# Patient Record
Sex: MALE | Race: WHITE
[De-identification: names, ages, dates, MRNs, and addresses within clinical notes are randomized per-mention and may not be internally consistent; named-entity substitution may affect disease eponyms.]

---

## 2018-02-12 ENCOUNTER — HOSPITAL ENCOUNTER (OUTPATIENT)
Dept: HOSPITAL 17 - CPRE | Age: 63
End: 2018-02-12
Attending: ORTHOPAEDIC SURGERY
Payer: OTHER GOVERNMENT

## 2018-02-12 DIAGNOSIS — M17.12: Primary | ICD-10-CM

## 2018-02-26 ENCOUNTER — HOSPITAL ENCOUNTER (INPATIENT)
Dept: HOSPITAL 17 - HSDI | Age: 63
LOS: 1 days | Discharge: HOME HEALTH | DRG: 470 | End: 2018-02-27
Attending: ORTHOPAEDIC SURGERY | Admitting: ORTHOPAEDIC SURGERY
Payer: OTHER GOVERNMENT

## 2018-02-26 VITALS
OXYGEN SATURATION: 97 % | TEMPERATURE: 95.9 F | RESPIRATION RATE: 17 BRPM | HEART RATE: 109 BPM | SYSTOLIC BLOOD PRESSURE: 112 MMHG | DIASTOLIC BLOOD PRESSURE: 69 MMHG

## 2018-02-26 VITALS
RESPIRATION RATE: 17 BRPM | DIASTOLIC BLOOD PRESSURE: 80 MMHG | SYSTOLIC BLOOD PRESSURE: 117 MMHG | OXYGEN SATURATION: 97 % | HEART RATE: 88 BPM | TEMPERATURE: 95.4 F

## 2018-02-26 VITALS — BODY MASS INDEX: 30.22 KG/M2 | HEIGHT: 66 IN | WEIGHT: 188.05 LBS

## 2018-02-26 VITALS
DIASTOLIC BLOOD PRESSURE: 78 MMHG | RESPIRATION RATE: 16 BRPM | SYSTOLIC BLOOD PRESSURE: 126 MMHG | TEMPERATURE: 97 F | HEART RATE: 82 BPM | OXYGEN SATURATION: 100 %

## 2018-02-26 VITALS — HEART RATE: 82 BPM

## 2018-02-26 VITALS — OXYGEN SATURATION: 97 %

## 2018-02-26 DIAGNOSIS — B19.20: ICD-10-CM

## 2018-02-26 DIAGNOSIS — I10: ICD-10-CM

## 2018-02-26 DIAGNOSIS — M17.12: Primary | ICD-10-CM

## 2018-02-26 PROCEDURE — 86900 BLOOD TYPING SEROLOGIC ABO: CPT

## 2018-02-26 PROCEDURE — 0QUC07Z SUPPLEMENT LEFT LOWER FEMUR WITH AUTOLOGOUS TISSUE SUBSTITUTE, OPEN APPROACH: ICD-10-PCS | Performed by: ORTHOPAEDIC SURGERY

## 2018-02-26 PROCEDURE — 85027 COMPLETE CBC AUTOMATED: CPT

## 2018-02-26 PROCEDURE — 0QUH07Z SUPPLEMENT LEFT TIBIA WITH AUTOLOGOUS TISSUE SUBSTITUTE, OPEN APPROACH: ICD-10-PCS | Performed by: ORTHOPAEDIC SURGERY

## 2018-02-26 PROCEDURE — 86901 BLOOD TYPING SEROLOGIC RH(D): CPT

## 2018-02-26 PROCEDURE — 86850 RBC ANTIBODY SCREEN: CPT

## 2018-02-26 PROCEDURE — 73560 X-RAY EXAM OF KNEE 1 OR 2: CPT

## 2018-02-26 PROCEDURE — L1830 KO IMMOB CANVAS LONG PRE OTS: HCPCS

## 2018-02-26 PROCEDURE — 0QBC0ZZ EXCISION OF LEFT LOWER FEMUR, OPEN APPROACH: ICD-10-PCS | Performed by: ORTHOPAEDIC SURGERY

## 2018-02-26 PROCEDURE — 0QPH04Z REMOVAL OF INTERNAL FIXATION DEVICE FROM LEFT TIBIA, OPEN APPROACH: ICD-10-PCS | Performed by: ORTHOPAEDIC SURGERY

## 2018-02-26 PROCEDURE — 94150 VITAL CAPACITY TEST: CPT

## 2018-02-26 PROCEDURE — 3E0T3BZ INTRODUCTION OF ANESTHETIC AGENT INTO PERIPHERAL NERVES AND PLEXI, PERCUTANEOUS APPROACH: ICD-10-PCS | Performed by: ANESTHESIOLOGY

## 2018-02-26 PROCEDURE — 80048 BASIC METABOLIC PNL TOTAL CA: CPT

## 2018-02-26 PROCEDURE — C1776 JOINT DEVICE (IMPLANTABLE): HCPCS

## 2018-02-26 PROCEDURE — 0SRD0J9 REPLACEMENT OF LEFT KNEE JOINT WITH SYNTHETIC SUBSTITUTE, CEMENTED, OPEN APPROACH: ICD-10-PCS | Performed by: ORTHOPAEDIC SURGERY

## 2018-02-26 PROCEDURE — 0QPC04Z REMOVAL OF INTERNAL FIXATION DEVICE FROM LEFT LOWER FEMUR, OPEN APPROACH: ICD-10-PCS | Performed by: ORTHOPAEDIC SURGERY

## 2018-02-26 RX ADMIN — VANCOMYCIN HYDROCHLORIDE SCH MLS/HR: 1 INJECTION, SOLUTION INTRAVENOUS at 08:59

## 2018-02-26 RX ADMIN — PANTOPRAZOLE SODIUM PRN MG: 20 TABLET, DELAYED RELEASE ORAL at 22:40

## 2018-02-26 RX ADMIN — PHENYTOIN SODIUM SCH MLS/HR: 50 INJECTION INTRAMUSCULAR; INTRAVENOUS at 18:07

## 2018-02-26 RX ADMIN — HYDROCODONE BITARTRATE AND ACETAMINOPHEN PRN TAB: 7.5; 325 TABLET ORAL at 21:11

## 2018-02-26 RX ADMIN — PHENYTOIN SODIUM SCH MLS/HR: 50 INJECTION INTRAMUSCULAR; INTRAVENOUS at 12:48

## 2018-02-26 RX ADMIN — VANCOMYCIN HYDROCHLORIDE SCH MLS/HR: 1 INJECTION, SOLUTION INTRAVENOUS at 10:17

## 2018-02-26 NOTE — MP
cc:

ANGELA NEGRETE M.D.

****

 

 

DATE OF SURGERY: 2/26/2018

 

PREOPERATIVE DIAGNOSIS:

Left knee severe osteoarthritis, history of prior ACL reconstruction with

retained hardware.

 

POSTOPERATIVE DIAGNOSES

Left knee severe osteoarthritis, history of prior ACL reconstruction with

retained hardware.

 

PROCEDURE

Left total knee arthroplasty with removal of deep hardware, left knee.

 

SURGEON

Dr. Angela Negrete.

 

FIRST ASSISTANT:

AUGIE Ward

 

ANESTHESIA

General.

 

ESTIMATED BLOOD LOSS:

100 cc.

 

TOURNIQUET TIME:

40 minutes at 250 mmHg.

 

COMPLICATIONS:

None.

 

IMPLANTS:

DePuy Attune size 6, posterior stabilized femoral component size 6, rotating

platform tibia baseplate size 5 mm, polyethylene  tibial insert size 38

patella.

 

JUSTIFICATION:

This patient is a gentleman who sustained previous injury to his left knee

resulting in a previous anterior cruciate ligament tear.  He underwent previous

anterior cruciate ligament reconstruction back in 1994.  He has developed

severe degenerative osteoarthritis of his knee over the subsequent years.  He

has severe disabling pain with standing, walking, ambulation with activities,

severe pain at rest.  It does interfere with activities of daily living.  The

patient has failed greater than 3 months of prior nonoperative conservative to

include medications, therapy, injections, ambulatory assistive aids, home

exercise program, activity modification.  The patient is not overweight.

 

X-rays of the left knee reveals severe end-stage osteoarthritis, bone-on-bone

joint space narrowing, subchondral sclerosis, subchondral cyst, osteophyte

formation, varus deformity with subluxation and evidence of prior retained

metal interference screw in both the femur and tibia.

 

The patient was counseled as to the risks, benefits and alternatives to total

knee arthroplasty with removal of deep hardware.  The risks were discussed

which included but not limited to bleeding, infection, damage to nerves, blood

vessels, pain, stiffness, failure of components, blood clots, pulmonary

embolism and even death.  The patient's pain is very severe and he did wish to

proceed with surgery.

 

PROCEDURE IN DETAIL:

A written consent was obtained.  The patient identified by name, taken to the

operating room table, placed in supine position.  General anesthesia was

administered, as well as 2 grams of IV Ancef, one gram of IV vancomycin.  He

did receive a preoperative adductor canal femoral nerve block by the

anesthesiologist using ultrasound guidance.  A well-padded tourniquet was

placed on the left thigh.  The left lower extremity was prepped and draped

using isopropyl alcohol, Hibiclens solution and Chloraprep solution.  After a

time-out was performed an Esmarch bandage was used to exsanguinate the left

lower extremity.  Tourniquet inflated 250 mmHg.  A longitudinal incision was

made in the anterior aspect of the left knee.  A medial parapatellar arthrotomy

was performed.  The patella was everted.  The  patellar resection guide was

used to resect 9 mm of patella.  The size 38 mm guide was placed.  The size 38

mm patella trial fit well.

 

At this point attention was turned to the medial aspect of the proximal tibia

where I was able to identify the metal tibial interference screw and using a

3.5-mm hex , I was able to remove that screw.  I then examined the

superolateral aspect of the femur and I was able to identify the metal femoral

interference screw and I used a 3.5 mm hex  to remove that screw as well.

 

 

At this point I took bone graft from the femur and I was able to fill the

tibial tunnel and femoral tunnel pathways where the prior screws were removed.

 

 

At this point a drill was used in the intramedullary canal of the femur.

Subsequently the distal femoral guide was set to remove 11 mm of distal femur,

5 degrees off the anatomic valgus axis alignment.  Oscillating saw was used for

the distal femoral cut.

 

Attention was turned to the tibia and extramedullary tibial guide was set to

remove 5 mm off the lowest portion of the medial tibial plateau.  The tibial

guide set in place and tibia cut was performed.  A 5-mm spacer block showed

full extension.  Attention turned back to the femur where AP sizing block

measured to size 6.  Anterior reference 30 degree external rotation guide was

used to pin a size 6 block in place.  The anterior, posterior and chamfer cuts

were performed. A size 6 PCL box was pinned in place and PCL box with an

oscillating saw.  The medial lateral meniscus remnants were removed as well as

bone and soft tissue debris from the posterior portion of the knee.  A size 6

tibia base plate was pinned in place and tibia was drilled and punched.  Trial

components were evaluated and cemented in place.

 

With the current component, the left leg achieved full extension to 0 degrees

of flexion 140, no evidence of tibial lift-off, varus-valgus balance appeared

appropriate and symmetric and the patella was noted to track centrally.  With

the tourniquet deflated Bovie cautery was used for hemostasis.  The knee was

thoroughly irrigated with sterile saline pulse lavage antibiotic impregnated

solution.  The arthrotomy incision was closed with 1-0 Vicryl suture.  The

subcutaneous tissue with 2-0 Vicryl suture and skin incision closed with

Dermabond.  Sterile dressing applied.  No intraoperative complications noted.

 

Primo Hernández, physician assistant certified, was present during the entire

procedure to include patient positioning and the procedure itself.  The medical

necessity of the physician assistant is indicated in this case due to the

complexity of the procedure itself.  He assisted with appropriate manipulation

of the leg, also retraction of muscle, tendon, bone and neurovascular

structures.  He assisted with implantation of the prosthetic replacement as

well.

 

                              _________________________________

                              MD MISSY Steve/ALDEN

D:  2/26/2018/11:27 AM

T:  2/26/2018/8:52 PM

Visit #:  [id number]

Job #:  [id number]

## 2018-02-26 NOTE — RADRPT
EXAM DATE/TIME:  02/26/2018 12:25 

 

[facility] COMPARISON:     

No previous studies available for comparison.

 

                     

INDICATIONS :     

Post-op left knee.

                     

 

MEDICAL HISTORY :     

None.          

 

SURGICAL HISTORY :     

Total knee replacement, left.   

 

ENCOUNTER:     

Initial                                        

 

ACUITY:     

1 day      

 

PAIN SCORE:     

0/10

 

LOCATION:     

Left  Knee

 

FINDINGS:     

Post surgical changes are noted following total knee replacement. Prosthetic components are well-seat
ed and satisfactorily aligned. There is no acute bony abnormality.

 

CONCLUSION:     

Satisfactory postoperative appearance of the left knee status post knee replacement.

 

 

 

 Jr Garcia MD on February 26, 2018 at 12:52           

Board Certified Radiologist.

 This report was verified electronically.

## 2018-02-26 NOTE — PD.CONS
HPI


Service


[facility]


Consult Requested By


 








Dr. Eagle.


Reason for Consult





 


Medical management


Primary Care Physician


[facility]


Diagnoses:  


History of Present Illness


This is a 62-year-old male with history severe left osteoarthritis of the knee 

presented with elective surgery with left knee total arthroplasty done today by 

Dr. Eagle.  Patient seen the PACU.  Patient has no complaints.  I reviewed his 

past medical history which includes hypertension and hepatitis C.  He stated 

his hepatitis C is not active.  Patient stated that he will have a couple 

drinks a day but does not need to drink every day.  He denies having any 

symptoms of alcohol withdrawals or feeling shaky if he does not drink.





Otherwise all other review of system reviewed and negative.





Past Family Social History


Allergies:  


Coded Allergies:  


     lisinopril (Verified  Allergy, Severe, Swelling, 2/26/18)


 swelling with high does only


Past Medical History


Hypertension


Hepatitis C treated


Past Surgical History


Previous knee surgery


Shoulder surgery


Wrist and hand surgery


Reported Medications








Aspirin Low Dose (Aspirin) 81 Mg Chew 81 Mg CHEW BID 30 Days


Norco (Hydrocodone-Acetaminophen) 7.5-325 mg Tab 1-2 Tab PO Q6H PRN


Amlodipine (Amlodipine Besylate) 10 Mg Tab 10 Mg PO DAILY


Active Ordered Medications





Current Medications


Amlodipine Besylate (Norvasc) 10 mg DAILY PO ;  Start 2/26/18 at 09:00


Sodium Chloride 1,000 ml @  100 mls/hr Q10H IV  Last administered on 2/26/18at 

12:48;  Start 2/26/18 at 08:45


Cefazolin Sodium 1000 mg/Sodium Chloride 100 ml @  200 mls/hr Q6H IV ;  Start 2/ 26/18 at 15:00;  Stop 2/27/18 at 03:29


Miscellaneous Information (Post-op Orders (for Pharmacy))  STAT  ONCE XX ;  

Start 2/26/18 at 09:00;  Stop 2/26/18 at 09:02;  Status DC


Enoxaparin Sodium (Lovenox Inj) 40 mg Q24H SQ ;  Start 2/27/18 at 11:00;  Stop 3

/8/18 at 11:01


Morphine Sulfate (Morphine Inj) 3 mg Q3H  PRN IV PUSH BREAKTHROUGH PAIN;  Start 

2/26/18 at 12:00


Acetaminophen/ Hydrocodone Bitart (Norco  7.5-325 Mg) 1 tab Q4H  PRN PO PAIN 

LESS THAN 5 ON SCALE;  Start 2/26/18 at 08:45


Acetaminophen/ Hydrocodone Bitart (Norco  7.5-325 Mg) 2 tab Q4H  PRN PO PAIN 

SCALE 5 TO 10;  Start 2/26/18 at 08:45


Multivitamins/ Minerals Therapeutic (Theragran M Tab) 1 tab BID PO ;  Start 2/27 /18 at 21:00;  Stop 4/28/18 at 20:59


Ondansetron HCl (Zofran Inj) 4 mg Q6H  PRN IVP NAUSEA OR VOMITING;  Start 2/26/ 18 at 08:45


Docusate Sodium (Colace) 100 mg BID PO ;  Start 2/27/18 at 21:00


Zolpidem Tartrate (Ambien) 5 mg HS  PRN PO SLEEP;  Start 2/26/18 at 21:00


Bisacodyl (Dulcolax Supp) 10 mg DAILY  PRN RECTAL CONSTIPATION;  Start 2/26/18 

at 09:15


Diphenhydramine HCl (Benadryl Inj) 25 mg Q6H  PRN IV PUSH ITCHING;  Start 2/26/ 18 at 08:45


Sodium Chloride 250 ml @  As Directed STK-MED ONCE .ROUTE ;  Start 2/26/18 at 07

:09;  Stop 2/26/18 at 07:10;  Status DC


Vancomycin HCl (Vancomycin Inj) 1,000 mg STK-MED ONCE .ROUTE ;  Start 2/26/18 

at 07:09;  Stop 2/26/18 at 07:10;  Status DC


Cefazolin Sodium/ Dextrose 50 ml @ As Directed STK-MED ONCE .ROUTE ;  Start 2/26 /18 at 07:09;  Stop 2/26/18 at 07:10;  Status DC


Dexamethasone Sodium Phosphate (Decadron Pf Inj) 10 mg STK-MED ONCE .ROUTE  

Last administered on 2/26/18at 07:26;  Start 2/26/18 at 07:10;  Stop 2/26/18 at 

07:11;  Status DC


Lactated Ringer's 1,000 ml @  30 mls/hr Q24H PRN IV SEE LABEL COMMENTS Last 

administered on 2/26/18at 08:00;  Start 2/26/18 at 08:15;  Stop 3/1/18 at 08:14


Sodium Chloride 500 ml @  30 mls/hr [id number] PRN IV SEE LABEL COMMENTS;  Start 2/ 26/18 at 08:15;  Stop 3/1/18 at 08:14


Metoprolol Tartrate (Lopressor) 25 mg ON CALL  PRN PO SEE LABEL COMMENTS;  

Start 2/26/18 at 08:15;  Stop 3/1/18 at 08:14


Povidone Iodine (Betadine 5% Antisepsis Kit) 1 applic ON CALL  PRN EACH NARE 

SEE LABEL COMMENTS Last administered on 2/26/18at 08:00;  Start 2/26/18 at 08:15

;  Stop 3/1/18 at 08:14


Chlorhexidine Gluconate (Chlorhexidine 2% Cloth) 3 pack ON CALL  PRN TOPICAL 

SEE LABEL COMMENTS Last administered on 2/26/18at 07:30;  Start 2/26/18 at 08:15

;  Stop 3/1/18 at 08:14


Povidone Iodine (Betadine 7.5% Scrub) 1 applic ONCE TOPICAL  Last administered 

on 2/26/18at 08:00;  Start 2/26/18 at 08:15;  Stop 3/1/18 at 08:14


Chlorhexidine Gluconate (Hibiclens 4% Top Soln) 1 applic ONCE TOPICAL ;  Start 2 /26/18 at 08:15;  Stop 3/1/18 at 08:14


Cefazolin Sodium/ Dextrose 50 ml @  100 mls/hr ON  CALL IV  Last administered 

on 2/26/18at 08:58;  Start 2/26/18 at 08:15;  Stop 3/1/18 at 08:14


Vancomycin HCl 1000 mg/Sodium Chloride 250 ml @  250 mls/hr ON  CALL IV  Last 

administered on 2/26/18at 10:17;  Start 2/26/18 at 08:15;  Stop 3/1/18 at 08:14


Ropivacaine 24.63 ml/Ketorolac Tromethamine 30 mg/Epinephrine HCl 0.5 mg/ 

Clonidine 80 mcg/ Sodium Chloride 100 ml @  200 mls/hr ON  CALL P-ARTICULR  

Last administered on 2/26/18at 11:07;  Start 2/26/18 at 09:00;  Stop 2/26/18 at 

15:00


Tranexamic Acid 3000 mg/Sodium Chloride 130 ml @  260 mls/hr ON  CALL P-

ARTICULR  Last administered on 2/26/18at 11:06;  Start 2/26/18 at 09:00;  Stop 2 /26/18 at 15:00


Tranexamic Acid 1279.5 mg/Sodium Chloride 112.795 ml  @ 200 mls/ hr ON  CALL IV

  Last administered on 2/26/18at 10:16;  Start 2/26/18 at 09:00;  Stop 2/26/18 

at 15:00


Dexamethasone Sodium Phosphate (Decadron Inj) 10 mg ON  CALL IV ;  Start 2/26/ 18 at 09:00;  Stop 2/26/18 at 23:59


Sodium Chloride 250 ml @  As Directed STK-MED ONCE .ROUTE ;  Start 2/26/18 at 08

:20;  Stop 2/26/18 at 08:21;  Status DC


Vancomycin HCl (Vancomycin Inj) 1,000 mg STK-MED ONCE .ROUTE  Last administered 

on 2/26/18at 08:43;  Start 2/26/18 at 08:20;  Stop 2/26/18 at 08:21;  Status DC


Midazolam HCl (Versed Inj) 5 mg STK-MED ONCE .ROUTE ;  Start 2/26/18 at 08:39;  

Stop 2/26/18 at 08:40;  Status DC


Bupivacaine HCl (Marcaine Pf 0.5% Inj) 30 ml STK-MED ONCE .ROUTE ;  Start 2/26/ 18 at 08:40;  Stop 2/26/18 at 08:41;  Status DC


Acetaminophen 0 ml @ As Directed STK-MED ONCE IV ;  Start 2/26/18 at 08:51;  

Stop 2/26/18 at 08:52;  Status DC


Hydromorphone HCl (Dilaudid Pf Inj) 2 mg STK-MED ONCE .ROUTE ;  Start 2/26/18 

at 08:51;  Stop 2/26/18 at 08:52;  Status DC


Midazolam HCl (Versed Inj) 2 mg STK-MED ONCE .ROUTE ;  Start 2/26/18 at 08:52;  

Stop 2/26/18 at 08:53;  Status DC


Fentanyl Citrate (fentaNYL INJ) 250 mcg STK-MED ONCE .ROUTE ;  Start 2/26/18 at 

08:52;  Stop 2/26/18 at 08:53;  Status DC


Morphine Sulfate (*morphine INJ PERIprocedure ONLY) 4 mg STK-MED ONCE .ROUTE  

Last administered on 2/26/18at 12:03;  Start 2/26/18 at 12:03;  Stop 2/26/18 at 

12:04;  Status DC


Morphine Sulfate (*morphine INJ PERIprocedure ONLY) 4 mg STK-MED ONCE .ROUTE  

Last administered on 2/26/18at 12:17;  Start 2/26/18 at 12:17;  Stop 2/26/18 at 

12:18;  Status DC


Miscellaneous Information ALL NURSING DEPARTME... UNSCH  PRN .XX SEE LABEL 

COMMENTS;  Start 2/26/18 at 11:52;  Stop 2/27/18 at 11:51


Family History


Patient had extensive family history of type 2 diabetes.


Social History


Drinks a couple beers a day.  Denies any tobacco or illicit drug use.





Physical Exam


Vital Signs





Vital Signs








  Date Time  Temp Pulse Resp B/P (MAP) Pulse Ox O2 Delivery O2 Flow Rate FiO2


 


2/26/18 13:03 95.9 109 17 112/69 (83) 97   


 


2/26/18 12:50 98.4 94 16 110/64 (79) 95 Nasal Cannula 3 


 


2/26/18 12:45  92 16 110/61 (77) 95 Nasal Cannula 3 


 


2/26/18 12:30  94 15 113/59 (77) 94 Nasal Cannula 3 


 


2/26/18 12:15  100 15 109/65 (80) 96 Nasal Cannula 3 


 


2/26/18 12:00  105 16 113/72 (86) 95 Nasal Cannula 3 


 


2/26/18 11:55 98.1 109 15 110/69 (83) 95 Nasal Cannula 3 


 


2/26/18 08:24      Nasal Cannula 2 


 


2/26/18 07:37 98.0 80 20 155/95 (115) 98   


 


2/26/18 07:12  82      








Physical Exam


GENERAL: This is a well-nourished, well-developed patient, in no apparent 

distress.


SKIN: No rashes, ecchymoses or lesions. Cool and dry.


HEAD: Atraumatic. Normocephalic. No temporal or scalp tenderness.


EYES: Pupils equal round and reactive. Extraocular motions intact. No scleral 

icterus. No injection or drainage. 


ENT: Nose without bleeding, purulent drainage or septal hematoma. Throat 

without erythema, tonsillar hypertrophy or exudate. Uvula midline. Airway 

patent.


NECK: Trachea midline. No JVD or lymphadenopathy. Supple, nontender, no 

meningeal signs.


CARDIOVASCULAR: Regular rate and rhythm without murmurs, gallops, or rubs. 


RESPIRATORY: Clear to auscultation. Breath sounds equal bilaterally. No wheezes

, rales, or rhonchi.  


GASTROINTESTINAL: Abdomen soft, non-tender, nondistended. No hepato-splenomegaly

, or palpable masses. No guarding.


MUSCULOSKELETAL: Left leg in bandages and in the CAM. No calf tenderness. 

Negative Homans sign bilaterally.


NEUROLOGICAL: Awake and alert. Cranial nerves II through XII intact.  Motor and 

sensory grossly within normal limits. Five out of 5 muscle strength in all 

muscle groups.  Normal speech.





Assessment and Plan


Assessment and Plan


This is a 62-year-old male with severe left OA of the knee presented with 

elective surgery.





Severe left OA of the knee


-Status post left knee arthroplasty done today 2/26 by Dr. Eagle.


-Management per orthopedic surgeon.





Hypertension/hepatitis C


-Continue with home medication.





DVT prophylaxis


-Lovenox.


Code Status


Full code


Discussed Condition With


Patient











Dana Loo MD Feb 26, 2018 14:37

## 2018-02-27 VITALS
SYSTOLIC BLOOD PRESSURE: 144 MMHG | OXYGEN SATURATION: 100 % | DIASTOLIC BLOOD PRESSURE: 76 MMHG | RESPIRATION RATE: 18 BRPM | TEMPERATURE: 98.3 F | HEART RATE: 86 BPM

## 2018-02-27 VITALS
HEART RATE: 75 BPM | RESPIRATION RATE: 17 BRPM | TEMPERATURE: 96.4 F | OXYGEN SATURATION: 98 % | SYSTOLIC BLOOD PRESSURE: 113 MMHG | DIASTOLIC BLOOD PRESSURE: 72 MMHG

## 2018-02-27 VITALS
OXYGEN SATURATION: 97 % | TEMPERATURE: 96.9 F | SYSTOLIC BLOOD PRESSURE: 105 MMHG | RESPIRATION RATE: 17 BRPM | DIASTOLIC BLOOD PRESSURE: 69 MMHG | HEART RATE: 75 BPM

## 2018-02-27 VITALS — OXYGEN SATURATION: 98 %

## 2018-02-27 LAB
BUN SERPL-MCNC: 13 MG/DL (ref 7–18)
CALCIUM SERPL-MCNC: 8.9 MG/DL (ref 8.5–10.1)
CHLORIDE SERPL-SCNC: 98 MEQ/L (ref 98–107)
CREAT SERPL-MCNC: 0.95 MG/DL (ref 0.6–1.3)
ERYTHROCYTE [DISTWIDTH] IN BLOOD BY AUTOMATED COUNT: 12.6 % (ref 11.6–17.2)
GFR SERPLBLD BASED ON 1.73 SQ M-ARVRAT: 81 ML/MIN (ref 89–?)
GLUCOSE SERPL-MCNC: 117 MG/DL (ref 74–106)
HCO3 BLD-SCNC: 27.7 MEQ/L (ref 21–32)
HCT VFR BLD CALC: 35.1 % (ref 39–51)
HGB BLD-MCNC: 12.9 GM/DL (ref 13–17)
MCH RBC QN AUTO: 32.8 PG (ref 27–34)
MCHC RBC AUTO-ENTMCNC: 36.6 % (ref 32–36)
MCV RBC AUTO: 89.5 FL (ref 80–100)
PLATELET # BLD: 124 TH/MM3 (ref 150–450)
PMV BLD AUTO: 8 FL (ref 7–11)
RBC # BLD AUTO: 3.92 MIL/MM3 (ref 4.5–5.9)
SODIUM SERPL-SCNC: 132 MEQ/L (ref 136–145)
WBC # BLD AUTO: 14.1 TH/MM3 (ref 4–11)

## 2018-02-27 RX ADMIN — PHENYTOIN SODIUM SCH MLS/HR: 50 INJECTION INTRAMUSCULAR; INTRAVENOUS at 07:43

## 2018-02-27 RX ADMIN — HYDROCODONE BITARTRATE AND ACETAMINOPHEN PRN TAB: 7.5; 325 TABLET ORAL at 07:35

## 2018-02-27 RX ADMIN — PHENYTOIN SODIUM SCH MLS/HR: 50 INJECTION INTRAMUSCULAR; INTRAVENOUS at 04:45

## 2018-02-27 RX ADMIN — PANTOPRAZOLE SODIUM PRN MG: 20 TABLET, DELAYED RELEASE ORAL at 08:30

## 2018-02-27 RX ADMIN — HYDROCODONE BITARTRATE AND ACETAMINOPHEN PRN TAB: 7.5; 325 TABLET ORAL at 11:19

## 2018-02-27 NOTE — HHI.DCPOC
Discharge Care Plan


Diagnosis:  


(1) Primary localized osteoarthrosis, lower leg








Your Health Problems Are: Difficulty with ADL








Goals to Promote Your Health


* To prevent worsening of your condition and complications


* To maintain your health at the optimal level


Directions to Meet Your Goals


*** Take your medications as prescribed


*** Follow your dietary instruction


*** Follow activity as directed








*** Keep your appointments as scheduled


*** Take your immunizations and boosters as scheduled


*** If your symptoms worsen call your PCP, if no PCP go to Urgent Care Center 

or Emergency Room***


*** Smoking is Dangerous to Your Health. Avoid second hand smoke***


***Call the 24-hour hour crisis hotline for domestic abuse at 1-710.947.3230***











Mike Henrández Feb 27, 2018 08:25
none

## 2018-02-27 NOTE — PD.ORT.PN
Subjective


Post Op Day #:  1


Subjective Remarks


doing better than he expected.  ready to go home.





Objective


Vitals





Vital Signs








  Date Time  Temp Pulse Resp B/P (MAP) Pulse Ox O2 Delivery O2 Flow Rate FiO2


 


2/27/18 08:15     98   21


 


2/27/18 07:45      Room Air  


 


2/27/18 07:42 98.3 86 18 144/76 (98) 100   


 


2/27/18 04:00 96.4 75 17 113/72 (86) 98   


 


2/27/18 00:05 96.9 75 17 105/69 (81) 97   


 


2/26/18 20:05 97.0 82 16 126/78 (94) 100   


 


2/26/18 19:00      Room Air  


 


2/26/18 18:50      Room Air  


 


2/26/18 16:36 95.4 88 17 117/80 (92) 97   


 


2/26/18 16:18     97   21


 


2/26/18 13:03 95.9 109 17 112/69 (83) 97   


 


2/26/18 12:50 98.4 94 16 110/64 (79) 95 Nasal Cannula 3 


 


2/26/18 12:45  92 16 110/61 (77) 95 Nasal Cannula 3 


 


2/26/18 12:30  94 15 113/59 (77) 94 Nasal Cannula 3 


 


2/26/18 12:15  100 15 109/65 (80) 96 Nasal Cannula 3 


 


2/26/18 12:00  105 16 113/72 (86) 95 Nasal Cannula 3 


 


2/26/18 11:55 98.1 109 15 110/69 (83) 95 Nasal Cannula 3 


 


2/26/18 08:24      Nasal Cannula 2 














I/O      


 


 2/26/18 2/26/18 2/26/18 2/27/18 2/27/18 2/27/18





 07:00 15:00 23:00 07:00 15:00 23:00


 


Intake Total  1975 ml 955 ml 719 ml  


 


Output Total  3300 ml    


 


Balance  -1325 ml 955 ml 719 ml  


 


      


 


Intake Oral   480 ml 360 ml  


 


IV Total  1900 ml 475 ml 359 ml  


 


Other  75 ml    


 


Output Urine Total  0 ml    


 


Estimated Blood Loss  300 ml    


 


Other  3000 ml    


 


# Voids  1 2 2  


 


# Bowel Movements   0 0  








Objective Remarks


in chair, nad, wife in room


dressing c/d/i


neg homans


nvi





Assessment & Plan


Ortho Post Op Day #:  1


Problem List:  


Assessment and Plan


s/p removal of hardware L knee and L TKA


wbat


ok to maintain dressing unless saturated


lovenox, d/c on [id number]


d/c planning home with hhc and pt - cleared today


rx in chart


f/up dr. malin 2 weeks











Mike Hernández Feb 27, 2018 08:24

## 2018-02-27 NOTE — HHI.FF
Face to Face Verification


Diagnosis:  


(1) Primary localized osteoarthrosis, lower leg


Physical Therapy


Gait training, Safety evaluation, Transfer training, bed to chair


Knee:  Total knee, Protocol: Left, Full weight bearing


Left LE Weight Bearing:  WB as tolerated





Nursing


RN:  3 days/week x 2 weeks


Nursing:  Dressing changes


Dressing Changes:  Daily dressing change


Additional Instructions


ok to maintain dressing unless saturated











I have seen patient Tarik DunnJr dina on 2/27/18. My clinical findings 

support the need for the requested home health care services because:








 Limited ability to care for self





 High risk of falls














I certify that my clinical findings support that this patient is homebound 

because:








 Post-op weakness





 Unsteady gait/balance

















Mike Hernández Feb 27, 2018 08:26